# Patient Record
Sex: FEMALE | Race: BLACK OR AFRICAN AMERICAN | NOT HISPANIC OR LATINO | URBAN - METROPOLITAN AREA
[De-identification: names, ages, dates, MRNs, and addresses within clinical notes are randomized per-mention and may not be internally consistent; named-entity substitution may affect disease eponyms.]

---

## 2024-01-23 ENCOUNTER — HOSPITAL ENCOUNTER (EMERGENCY)
Facility: HOSPITAL | Age: 29
Discharge: HOME | End: 2024-01-23
Payer: COMMERCIAL

## 2024-01-23 VITALS
SYSTOLIC BLOOD PRESSURE: 109 MMHG | DIASTOLIC BLOOD PRESSURE: 73 MMHG | HEIGHT: 64 IN | TEMPERATURE: 97.3 F | RESPIRATION RATE: 18 BRPM | OXYGEN SATURATION: 97 % | HEART RATE: 91 BPM | BODY MASS INDEX: 33.8 KG/M2 | WEIGHT: 198 LBS

## 2024-01-23 DIAGNOSIS — K04.7 DENTAL INFECTION: Primary | ICD-10-CM

## 2024-01-23 LAB — PREGNANCY TEST URINE, POC: NEGATIVE

## 2024-01-23 PROCEDURE — 99283 EMERGENCY DEPT VISIT LOW MDM: CPT

## 2024-01-23 PROCEDURE — 81025 URINE PREGNANCY TEST: CPT | Performed by: PHYSICIAN ASSISTANT

## 2024-01-23 PROCEDURE — 2500000004 HC RX 250 GENERAL PHARMACY W/ HCPCS (ALT 636 FOR OP/ED): Performed by: PHYSICIAN ASSISTANT

## 2024-01-23 PROCEDURE — 99283 EMERGENCY DEPT VISIT LOW MDM: CPT | Performed by: PHYSICIAN ASSISTANT

## 2024-01-23 PROCEDURE — 2500000001 HC RX 250 WO HCPCS SELF ADMINISTERED DRUGS (ALT 637 FOR MEDICARE OP): Performed by: PHYSICIAN ASSISTANT

## 2024-01-23 RX ORDER — AMOXICILLIN AND CLAVULANATE POTASSIUM 875; 125 MG/1; MG/1
1 TABLET, FILM COATED ORAL ONCE
Status: COMPLETED | OUTPATIENT
Start: 2024-01-23 | End: 2024-01-23

## 2024-01-23 RX ORDER — NAPROXEN 500 MG/1
500 TABLET ORAL ONCE
Status: COMPLETED | OUTPATIENT
Start: 2024-01-23 | End: 2024-01-23

## 2024-01-23 RX ORDER — DEXAMETHASONE 4 MG/1
8 TABLET ORAL EVERY 8 HOURS
Qty: 4 TABLET | Refills: 0 | Status: SHIPPED | OUTPATIENT
Start: 2024-01-23 | End: 2024-01-24

## 2024-01-23 RX ORDER — NAPROXEN 500 MG/1
500 TABLET ORAL 2 TIMES DAILY
Qty: 20 TABLET | Refills: 0 | Status: SHIPPED | OUTPATIENT
Start: 2024-01-23

## 2024-01-23 RX ORDER — AMOXICILLIN AND CLAVULANATE POTASSIUM 875; 125 MG/1; MG/1
1 TABLET, FILM COATED ORAL EVERY 12 HOURS
Qty: 20 TABLET | Refills: 0 | Status: SHIPPED | OUTPATIENT
Start: 2024-01-23 | End: 2024-02-02

## 2024-01-23 RX ADMIN — DEXAMETHASONE 8 MG: 6 TABLET ORAL at 07:55

## 2024-01-23 RX ADMIN — AMOXICILLIN AND CLAVULANATE POTASSIUM 875 MG: 875; 125 TABLET, FILM COATED ORAL at 07:55

## 2024-01-23 RX ADMIN — NAPROXEN 500 MG: 500 TABLET ORAL at 07:55

## 2024-01-23 ASSESSMENT — LIFESTYLE VARIABLES
EVER HAD A DRINK FIRST THING IN THE MORNING TO STEADY YOUR NERVES TO GET RID OF A HANGOVER: NO
HAVE YOU EVER FELT YOU SHOULD CUT DOWN ON YOUR DRINKING: NO
EVER FELT BAD OR GUILTY ABOUT YOUR DRINKING: NO
REASON UNABLE TO ASSESS: NO
HAVE PEOPLE ANNOYED YOU BY CRITICIZING YOUR DRINKING: NO

## 2024-01-23 ASSESSMENT — PAIN SCALES - GENERAL: PAINLEVEL_OUTOF10: 3

## 2024-01-23 ASSESSMENT — COLUMBIA-SUICIDE SEVERITY RATING SCALE - C-SSRS
1. IN THE PAST MONTH, HAVE YOU WISHED YOU WERE DEAD OR WISHED YOU COULD GO TO SLEEP AND NOT WAKE UP?: NO
2. HAVE YOU ACTUALLY HAD ANY THOUGHTS OF KILLING YOURSELF?: NO
6. HAVE YOU EVER DONE ANYTHING, STARTED TO DO ANYTHING, OR PREPARED TO DO ANYTHING TO END YOUR LIFE?: NO

## 2024-01-23 ASSESSMENT — PAIN - FUNCTIONAL ASSESSMENT: PAIN_FUNCTIONAL_ASSESSMENT: 0-10

## 2024-01-23 ASSESSMENT — PAIN DESCRIPTION - LOCATION: LOCATION: JAW

## 2024-01-23 NOTE — ED PROVIDER NOTES
HPI:  28-year-old female otherwise healthy presents complaining of right lower dental pain.  States it has been hurting for about a week now.  Noticed swelling started couple days ago.  Denies any trouble eating or trouble swallowing however does have pain with chewing.  Went to dentist a few weeks ago however felt that there was no active dental problem, however this was prior to her current dental issues.  Denies any fevers chills night sweats or rigors.    Physical Exam:   GEN: Vitals noted. NAD  EYES:  EOMs grossly intact, anicteric sclera  SAMI: Mucosa moist.  Tenderness to the right lower posterior molar with mild gumline swelling appreciated externally, no obvious abscess, no trismus, no muffled voice, tolerating secretions well without difficulty  NECK: Supple.  CARD: RRR  PULMONARY: Moving air well. Clear all lung fields.  ABDOMEN: Soft, no guarding, no rigidity. Nontender. NABS  EXTREMITIES: Full ROM, no pitting edema,   SKIN: Intact, warm and dry  NEURO: Alert and oriented x 3, speech is clear, no obvious deficits noted.       ----------------------------------------------------------------------------------------------------------------------------    MDM:  28-year-old female presenting for dental pain with swelling.  On exam she is well-appearing and uncomfortable.  Vital signs stable.  She has mild right lower posterior tenderness in her molar with very mild swelling.  I believe she has an early dental infection causing her symptoms.  No trismus or signs of abscess clinically to indicate advanced imaging laboratory work or consulting surgery at this time.  Will start her on Augmentin give her a few doses of Decadron, naproxen for pain.  She is recommended to follow-up with dentist as soon as possible for her new pain, to return for new concerning or worsening symptoms that require emergent evaluation.  Return precautions reviewed.  Diagnoses as of 01/23/24 0750   Dental infection   []    No orders to  display     Labs Reviewed   POCT PREGNANCY, URINE - Normal       Result Value    Preg Test, Ur Negative         ----------------------------------------------------------------------------------------------------------------------------    This note was dictated using a speech recognition program.  While an attempt was made at proof reading to minimize errors, minor errors in transcription may be present call for questions.     Raghavendra Vazquez PA-C  01/23/24 5181

## 2024-01-23 NOTE — ED TRIAGE NOTES
Patient presents to the Emergency department with a chief complaint of lower right jaw dental pain x3-4 days. Patient states she is concerned she has an abscess in her gumline

## 2024-01-23 NOTE — Clinical Note
Lennox Castano was seen and treated in our emergency department on 1/23/2024.  She may return to work on 01/25/2024.       If you have any questions or concerns, please don't hesitate to call.      Raghavendra Vazquez PA-C

## 2024-01-23 NOTE — DISCHARGE INSTRUCTIONS
Take the full course of the Augmentin.  Take the naproxen for pain as well as your over-the-counter Tylenol.  Repeat the doses of the Decadron every 8 hours, starting 8 hours after first dose this morning.  Follow-up with the dentist, if you need a new 1 call [facility] at [phone]

## 2024-06-22 NOTE — PROGRESS NOTES
"Subjective   Lennox Castano is a 28 y.o. female who is here for an annual gyn exam.   Concerns: Patient would like IUD removed. She is no longer sexually active.     No LMP recorded (approximate). Patient has had an IUD.     Last pap: 2020 neg   History of abnormal Pap smear: no  STI testing: yes - all  HPV vaccination: Yes x1, amenable to #2 today     SoHx:  Vaginal hygiene: soap and water   Loss of sexual desire: Yes  Pain with sex: No  Able to orgasm: unsure   Living Situation: kids  Safe at home: Yes   Hx of DV: No   School/Employment: patient care at   Exercise: Yes     Substance:   Tobacco Use: Low Risk  (2024)    Patient History     Smoking Tobacco Use: Never     Smokeless Tobacco Use: Never     Passive Exposure: Not on file      Social History     Substance and Sexual Activity   Drug Use Not Currently    Types: Marijuana      Social History     Substance and Sexual Activity   Alcohol Use Not Currently       Social History     Substance and Sexual Activity   Sexual Activity Not Currently    Birth control/protection: I.U.D.     OB History          2    Para   2    Term           2    AB        Living   2         SAB        IAB        Ectopic        Multiple        Live Births   2               Past Medical History:   Diagnosis Date    Asthma ([facility])     Depression     Eczema     History of genital warts     s/p cryotherapy in     Hx of trichomoniasis      Past Surgical History:   Procedure Laterality Date    OTHER SURGICAL HISTORY  2015    Bronchoscopy (Diagnostic)     Family History   Problem Relation Name Age of Onset    Diabetes Mother      Diabetes Brother      Diabetes Mother's Sister      Diabetes Maternal Grandfather      Breast cancer Neg Hx      Ovarian cancer Neg Hx      Colon cancer Neg Hx      Pancreatic cancer Neg Hx         Review of Systems   All other systems reviewed and are negative.      Objective   /76   Ht 1.626 m (5' 4\")   Wt 90.3 kg (199 lb)  "  LMP  (Approximate) Comment: IUD mirena  BMI 34.16 kg/m²     Physical Exam  Constitutional:       Appearance: Normal appearance.   Pulmonary:      Effort: Pulmonary effort is normal.   Chest:      Comments: Declined exam    Genitourinary:     General: Normal vulva.      Vagina: Normal.      Cervix: Normal.   Skin:     General: Skin is warm and dry.   Neurological:      Mental Status: She is alert.   Psychiatric:         Mood and Affect: Mood normal.         Behavior: Behavior normal.         Thought Content: Thought content normal.         Judgment: Judgment normal.       IUD Removal    Date/Time: 6/28/2024 10:14 AM    Performed by: ELLYN Yeager  Authorized by: ELLYN Yeager    Consent:     Consent obtained:  Verbal and written    Consent given by:  Patient    Procedure risks and benefits discussed: yes      Patient questions answered: yes      Patient agrees, verbalizes understanding, and wants to proceed: yes    Procedure:     Removed with no complications: yes        Assessment/Plan   Problem List Items Addressed This Visit       Gynecologic exam normal    Overview     Pap collected  STI testing  HPV #2 today, discussed returning for #3 in approximately 4 months  IUD removed  Reviewed normal female anatomy; that hers looked very normal           Other Visit Diagnoses       Encounter for gynecological examination without abnormal finding    -  Primary    Relevant Orders    THINPREP PAP    Encounter for smoking cessation counseling        Relevant Medications    nicotine (Nicoderm CQ) 7 mg/24 hr patch    Screening examination for STI        Relevant Orders    HIV 1/2 Antigen/Antibody Screen with Reflex to Confirmation    Hepatitis C Antibody    Hepatitis B Surface Antigen    Syphilis Screen with Reflex    C. Trachomatis / N. Gonorrhoeae, Amplified Detection    Trichomonas vaginalis, Nucleic Acid Detection    Screening for malignant neoplasm of cervix        Relevant Orders     THINPREP PAP    Encounter for IUD removal        HPV vaccine counseling        Need for HPV vaccine                Plan:   -nicotine patch for smoking cessation sent   -discussed about breast self awareness  -reviewed mammogram starting at 40 years old unless otherwise clinically indicated   -encouraged healthy eating, exercise recommendations based of off  min of moderate intensity exercise a week  -RTC in 1  year for next annual or prn    Priyanka Coelho, HAL-ANIYAHM

## 2024-06-28 ENCOUNTER — OFFICE VISIT (OUTPATIENT)
Dept: OBSTETRICS AND GYNECOLOGY | Facility: HOSPITAL | Age: 29
End: 2024-06-28
Payer: COMMERCIAL

## 2024-06-28 VITALS
BODY MASS INDEX: 33.97 KG/M2 | SYSTOLIC BLOOD PRESSURE: 112 MMHG | WEIGHT: 199 LBS | HEIGHT: 64 IN | DIASTOLIC BLOOD PRESSURE: 76 MMHG

## 2024-06-28 DIAGNOSIS — Z11.3 SCREENING EXAMINATION FOR STI: ICD-10-CM

## 2024-06-28 DIAGNOSIS — Z71.6 ENCOUNTER FOR SMOKING CESSATION COUNSELING: ICD-10-CM

## 2024-06-28 DIAGNOSIS — Z12.4 SCREENING FOR MALIGNANT NEOPLASM OF CERVIX: ICD-10-CM

## 2024-06-28 DIAGNOSIS — Z23 NEED FOR HPV VACCINE: ICD-10-CM

## 2024-06-28 DIAGNOSIS — Z30.432 ENCOUNTER FOR IUD REMOVAL: ICD-10-CM

## 2024-06-28 DIAGNOSIS — Z01.419 GYNECOLOGIC EXAM NORMAL: ICD-10-CM

## 2024-06-28 DIAGNOSIS — Z71.85 HPV VACCINE COUNSELING: ICD-10-CM

## 2024-06-28 DIAGNOSIS — Z01.419 ENCOUNTER FOR GYNECOLOGICAL EXAMINATION WITHOUT ABNORMAL FINDING: Primary | ICD-10-CM

## 2024-06-28 PROCEDURE — 90651 9VHPV VACCINE 2/3 DOSE IM: CPT | Performed by: ADVANCED PRACTICE MIDWIFE

## 2024-06-28 PROCEDURE — 58301 REMOVE INTRAUTERINE DEVICE: CPT | Performed by: ADVANCED PRACTICE MIDWIFE

## 2024-06-28 PROCEDURE — 87661 TRICHOMONAS VAGINALIS AMPLIF: CPT | Performed by: ADVANCED PRACTICE MIDWIFE

## 2024-06-28 PROCEDURE — 87491 CHLMYD TRACH DNA AMP PROBE: CPT | Performed by: ADVANCED PRACTICE MIDWIFE

## 2024-06-28 PROCEDURE — 99395 PREV VISIT EST AGE 18-39: CPT | Performed by: ADVANCED PRACTICE MIDWIFE

## 2024-06-28 RX ORDER — LEVONORGESTREL 52 MG/1
1 INTRAUTERINE DEVICE INTRAUTERINE ONCE
COMMUNITY
End: 2024-06-28 | Stop reason: ALTCHOICE

## 2024-06-28 RX ORDER — NICOTINE 7MG/24HR
1 PATCH, TRANSDERMAL 24 HOURS TRANSDERMAL EVERY 24 HOURS
Qty: 30 PATCH | Refills: 3 | Status: SHIPPED | OUTPATIENT
Start: 2024-06-28 | End: 2024-07-28

## 2024-06-28 RX ORDER — ALBUTEROL SULFATE 90 UG/1
4 AEROSOL, METERED RESPIRATORY (INHALATION) EVERY 6 HOURS PRN
COMMUNITY
Start: 2019-02-13

## 2024-06-28 SDOH — ECONOMIC STABILITY: FOOD INSECURITY: WITHIN THE PAST 12 MONTHS, YOU WORRIED THAT YOUR FOOD WOULD RUN OUT BEFORE YOU GOT MONEY TO BUY MORE.: NEVER TRUE

## 2024-06-28 SDOH — ECONOMIC STABILITY: FOOD INSECURITY: WITHIN THE PAST 12 MONTHS, THE FOOD YOU BOUGHT JUST DIDN'T LAST AND YOU DIDN'T HAVE MONEY TO GET MORE.: NEVER TRUE

## 2024-06-28 ASSESSMENT — ENCOUNTER SYMPTOMS
DEPRESSION: 0
LOSS OF SENSATION IN FEET: 0
OCCASIONAL FEELINGS OF UNSTEADINESS: 0

## 2024-06-28 ASSESSMENT — PATIENT HEALTH QUESTIONNAIRE - PHQ9
2. FEELING DOWN, DEPRESSED OR HOPELESS: NOT AT ALL
SUM OF ALL RESPONSES TO PHQ9 QUESTIONS 1 & 2: 0
1. LITTLE INTEREST OR PLEASURE IN DOING THINGS: NOT AT ALL

## 2024-06-28 ASSESSMENT — PAIN SCALES - GENERAL: PAINLEVEL: 0-NO PAIN

## 2024-06-29 LAB
C TRACH RRNA SPEC QL NAA+PROBE: NEGATIVE
N GONORRHOEA DNA SPEC QL PROBE+SIG AMP: NEGATIVE
T VAGINALIS RRNA SPEC QL NAA+PROBE: NEGATIVE

## 2024-07-09 LAB
CYTOLOGY CMNT CVX/VAG CYTO-IMP: NORMAL
LAB AP HPV GENOTYPE QUESTION: YES
LAB AP HPV HR: NORMAL
LABORATORY COMMENT REPORT: NORMAL
LABORATORY COMMENT REPORT: NORMAL
PATH REPORT.TOTAL CANCER: NORMAL

## 2024-11-10 ENCOUNTER — HOSPITAL ENCOUNTER (EMERGENCY)
Facility: HOSPITAL | Age: 29
Discharge: HOME | End: 2024-11-10

## 2024-11-10 VITALS
DIASTOLIC BLOOD PRESSURE: 83 MMHG | RESPIRATION RATE: 16 BRPM | BODY MASS INDEX: 33.32 KG/M2 | TEMPERATURE: 98.2 F | SYSTOLIC BLOOD PRESSURE: 125 MMHG | WEIGHT: 200 LBS | HEIGHT: 65 IN | OXYGEN SATURATION: 100 % | HEART RATE: 87 BPM

## 2024-11-10 DIAGNOSIS — H10.31 ACUTE CONJUNCTIVITIS OF RIGHT EYE, UNSPECIFIED ACUTE CONJUNCTIVITIS TYPE: Primary | ICD-10-CM

## 2024-11-10 PROCEDURE — 99284 EMERGENCY DEPT VISIT MOD MDM: CPT | Performed by: PHYSICIAN ASSISTANT

## 2024-11-10 PROCEDURE — 99283 EMERGENCY DEPT VISIT LOW MDM: CPT

## 2024-11-10 RX ORDER — TETRACAINE HYDROCHLORIDE 5 MG/ML
1 SOLUTION OPHTHALMIC ONCE
Status: DISCONTINUED | OUTPATIENT
Start: 2024-11-10 | End: 2024-11-10 | Stop reason: HOSPADM

## 2024-11-10 RX ORDER — POLYMYXIN B SULFATE AND TRIMETHOPRIM 1; 10000 MG/ML; [USP'U]/ML
1 SOLUTION OPHTHALMIC EVERY 4 HOURS
Qty: 10 ML | Refills: 0 | Status: SHIPPED | OUTPATIENT
Start: 2024-11-10 | End: 2024-11-17

## 2024-11-10 ASSESSMENT — COLUMBIA-SUICIDE SEVERITY RATING SCALE - C-SSRS
6. HAVE YOU EVER DONE ANYTHING, STARTED TO DO ANYTHING, OR PREPARED TO DO ANYTHING TO END YOUR LIFE?: NO
1. IN THE PAST MONTH, HAVE YOU WISHED YOU WERE DEAD OR WISHED YOU COULD GO TO SLEEP AND NOT WAKE UP?: NO
2. HAVE YOU ACTUALLY HAD ANY THOUGHTS OF KILLING YOURSELF?: NO

## 2024-11-10 ASSESSMENT — LIFESTYLE VARIABLES
EVER FELT BAD OR GUILTY ABOUT YOUR DRINKING: NO
HAVE PEOPLE ANNOYED YOU BY CRITICIZING YOUR DRINKING: NO
HAVE YOU EVER FELT YOU SHOULD CUT DOWN ON YOUR DRINKING: NO
TOTAL SCORE: 0
EVER HAD A DRINK FIRST THING IN THE MORNING TO STEADY YOUR NERVES TO GET RID OF A HANGOVER: NO

## 2024-11-10 NOTE — Clinical Note
Lennox Castano was seen and treated in our emergency department on 11/10/2024.  She may return to work on 11/12/2024.       If you have any questions or concerns, please don't hesitate to call.      Lyudmila Burton PA-C

## 2024-11-10 NOTE — ED PROVIDER NOTES
"27 yo female with PMH of asthma presenting with right eye redness and irritation. Pt states she woke up yesterday afternoon with her eye crusted shut. Initially had FB sensation which has since subsided. Complains of itching, mild pain, and discharge. Denies fevers, chills, and visual changes. Has a young daughter who recently had bacterial conjunctivitis.  She denies any glasses or contact use.  She states otherwise she has been in her normal state of health.  She denies any URI symptoms.               Visit Vitals  /83   Pulse 87   Temp 36.8 °C (98.2 °F) (Temporal)   Resp 16   Ht 1.651 m (5' 5\")   Wt 90.7 kg (200 lb)   SpO2 100%   BMI 33.28 kg/m²   OB Status Implant   Smoking Status Never   BSA 2.04 m²          Physical Exam     Physical exam:  Gen.: Vitals noted no distress afebrile. Normal phonation, no stridor, no trismus.   Optho: EOMI. PERRL. Exam of the left eye is unremarkable. Exam of the right eye shows conjunctival injection. There are no obvious foreign bodies. The lids were everted without foreign bodies as well.  fluorescein was instilled with resolution of the patient's pain. Was viewed under the wood's lamp. There did not appear to be any dye uptake, ulcerations, abrasions, or dendrites. Negative Bhavesh sign. There is no direct or consensual photophobia. No pre-or post-septal cellulitis.   ENT: Hearing grossly intact. Mastoids nontender. Mucous membranes moist. Posterior oropharynx without erythema, exudate, or swelling. Uvula is in the midline and nonedematous.   Neck: Supple. No meningismus through full range of motion. No lymphadenopathy.   Cardiac: Regular rate rhythm no murmur.   Pulmonary: Good air exchange. Lungs clear bilaterally with good chest wall excursion. No rales, rhonchi or wheezing. No accessory muscle use or stridor.  Abdomen: Soft nontender nonsurgical throughout. Normoactive bowel sounds.   Extremities: No peripheral edema, full range of motion. Moves all extremities " freely.  Skin: No rash.   Neuro: No focal neurologic deficits.       Labs Reviewed - No data to display    No orders to display           ED Course & MDM     Medical Decision Making  This is a 28-year-old female with no significant past medical history presents to the ED with right eye pain, redness, and crusting for the past 2 days.  Vital stable upon arrival to the ED.  On examination patient did have conjunctival injection to the right eye.  No crusting noted to her eyelashes.  No purulent drainage.  PERRL.  EOMI.  No erythema or excess warmth noted to her upper or lower eyelids.  No pain with extraocular eye movements.  Visual acuity obtained and was 20/20 left eye and 20/30 right eye.  Patient's eye was viewed under Woods lamp with fluorescein instilled.  Patient declined tetracaine.  There was no areas of dye uptake.  I discussed this with the patient.  She will be treated for conjunctivitis today with Polytrim drops.  She was advised to follow with her primary care provider as needed.  She was agreeable to this plan.  She was given signs and symptoms to return to the ED with and was discharged from the ED in stable condition.    Risk  Prescription drug management.         Diagnoses as of 11/10/24 0755   Acute conjunctivitis of right eye, unspecified acute conjunctivitis type       Patient was seen independently    Procedures    JONAH Majano, PA-C     Lyudmila Burton PA-C  11/10/24 0752

## 2025-07-09 ENCOUNTER — OFFICE VISIT (OUTPATIENT)
Dept: OBSTETRICS AND GYNECOLOGY | Facility: CLINIC | Age: 30
End: 2025-07-09
Payer: COMMERCIAL

## 2025-07-09 VITALS
SYSTOLIC BLOOD PRESSURE: 104 MMHG | DIASTOLIC BLOOD PRESSURE: 73 MMHG | HEART RATE: 92 BPM | BODY MASS INDEX: 35.13 KG/M2 | WEIGHT: 211.1 LBS

## 2025-07-09 DIAGNOSIS — Z11.3 SCREENING EXAMINATION FOR STI: ICD-10-CM

## 2025-07-09 DIAGNOSIS — Z01.419 WELL WOMAN EXAM: Primary | ICD-10-CM

## 2025-07-09 DIAGNOSIS — N92.0 MENORRHAGIA WITH REGULAR CYCLE: ICD-10-CM

## 2025-07-09 DIAGNOSIS — N89.8 VAGINAL DISCHARGE: ICD-10-CM

## 2025-07-09 PROCEDURE — 1036F TOBACCO NON-USER: CPT

## 2025-07-09 PROCEDURE — 99395 PREV VISIT EST AGE 18-39: CPT

## 2025-07-09 ASSESSMENT — PAIN SCALES - GENERAL: PAINLEVEL_OUTOF10: 0-NO PAIN

## 2025-07-09 ASSESSMENT — ENCOUNTER SYMPTOMS
GASTROINTESTINAL NEGATIVE: 0
CONSTITUTIONAL NEGATIVE: 0
MUSCULOSKELETAL NEGATIVE: 0
CARDIOVASCULAR NEGATIVE: 0
ENDOCRINE NEGATIVE: 0
PSYCHIATRIC NEGATIVE: 0
RESPIRATORY NEGATIVE: 0
ALLERGIC/IMMUNOLOGIC NEGATIVE: 0
HEMATOLOGIC/LYMPHATIC NEGATIVE: 0
NEUROLOGICAL NEGATIVE: 0
EYES NEGATIVE: 0

## 2025-07-09 NOTE — PROGRESS NOTES
Subjective   Lennox Castano is a 29 y.o. female who is here for a routine exam.  She is sexually active and desires sti screening. She has no pain or bleeding with sex. She denies bladder or bowel concerns.    Patient has concerns from increase in watery discharge and odor over the last month. Patient reports mild itching. Patient reports using monistat without resolution in symptoms.     Current contraception: none  LMP: 6/15/25  Menses: regular and monthly. Heavy first two days with every 1-2 hour pad changes on day two.   Last pap:  nml  History of abnormal Pap smear: no  Due for pap: no  Family history of uterine or ovarian cancer: yes - Maternal aunt with uterine cancer  Family history of prostate cancer: no  Family history of pancreatic cancer: no  Family hx of BRCA1/BRCA2: no  Family history of breast cancer: no  Last mammogram: none  Gardasil: had      OB History          2    Para   2    Term           2    AB        Living   2         SAB        IAB        Ectopic        Multiple        Live Births   2                 Review of Systems    Objective   /73   Pulse 92   Wt 95.8 kg (211 lb 1.6 oz)   LMP 06/15/2025   BMI 35.13 kg/m²     Physical Exam  HENT:      Mouth/Throat:      Mouth: Mucous membranes are moist.   Eyes:      Conjunctiva/sclera: Conjunctivae normal.   Neck:      Thyroid: No thyroid mass, thyromegaly or thyroid tenderness.   Cardiovascular:      Rate and Rhythm: Normal rate and regular rhythm.      Pulses: Normal pulses.   Pulmonary:      Effort: Pulmonary effort is normal.      Breath sounds: Normal breath sounds.   Chest:   Breasts:     Breasts are symmetrical.      Right: Normal.      Left: Normal.   Abdominal:      General: Abdomen is flat.      Palpations: Abdomen is soft.      Hernia: There is no hernia in the left inguinal area or right inguinal area.   Genitourinary:     General: Normal vulva.      Vagina: Normal.      Cervix: Normal.      Uterus: Normal.        Adnexa: Right adnexa normal and left adnexa normal.      Comments: Scant amount of thin white discharge noted in vaginal vault.   Musculoskeletal:         General: Normal range of motion.      Cervical back: Normal range of motion.   Lymphadenopathy:      Cervical: No cervical adenopathy.      Right cervical: No superficial, deep or posterior cervical adenopathy.     Left cervical: No superficial, deep or posterior cervical adenopathy.      Lower Body: No right inguinal adenopathy. No left inguinal adenopathy.   Skin:     General: Skin is warm.      Capillary Refill: Capillary refill takes less than 2 seconds.   Neurological:      Mental Status: She is alert and oriented to person, place, and time.   Psychiatric:         Mood and Affect: Mood normal.         Behavior: Behavior normal.       Assessment/Plan   A&P --> 29 y.o. y.o woman for annual GYN exam.     - Health Maintenance -->  - Routine follow up with PCP for health maintenance examination encouraged, including TSH, cholesterol, and Vit. D evaluation.  Self breast awareness encouraged; concerning characteristics of breasts reviewed - Pt. will report general concerns, any adherent lumps, skin dimpling/puckering or color changes, and any nipple discharge.    Diet and exercise reviewed.     Pap-utd:- Reviewed ACOG and ASCCP guidelines with pt.      - STD Screening: genital cx for gc/ct and trich. Orders placed for hep B and C, syphilis, and HIV     - Contraception Plan: none, declines. Encouraged safe sex practices    - menorrhagia: patient declines contraception for management.  Discussed NSAID use as an alternative. Advised on Ibuprofen 600 mg two to four times daily or   Naproxen 500 mg at onset followed by 250  mg to 500 mg twice a day for the first few days of menses.      - F/U 1 year or as needed.    HAL Putnam-LEANN

## 2025-07-10 LAB
BV SCORE VAG QL: NORMAL
C TRACH RRNA SPEC QL NAA+PROBE: NOT DETECTED
N GONORRHOEA RRNA SPEC QL NAA+PROBE: NOT DETECTED
QUEST GC CT AMPLIFIED (ALWAYS MESSAGE): NORMAL
T VAGINALIS RRNA SPEC QL NAA+PROBE: NOT DETECTED

## 2025-07-11 ENCOUNTER — TELEPHONE (OUTPATIENT)
Dept: OBSTETRICS AND GYNECOLOGY | Facility: CLINIC | Age: 30
End: 2025-07-11
Payer: COMMERCIAL

## 2025-08-20 ENCOUNTER — HOSPITAL ENCOUNTER (EMERGENCY)
Facility: HOSPITAL | Age: 30
Discharge: HOME | End: 2025-08-20
Attending: EMERGENCY MEDICINE
Payer: COMMERCIAL

## 2025-08-20 VITALS
BODY MASS INDEX: 35.34 KG/M2 | WEIGHT: 207 LBS | HEIGHT: 64 IN | RESPIRATION RATE: 16 BRPM | HEART RATE: 76 BPM | DIASTOLIC BLOOD PRESSURE: 70 MMHG | TEMPERATURE: 97.3 F | SYSTOLIC BLOOD PRESSURE: 105 MMHG | OXYGEN SATURATION: 97 %

## 2025-08-20 DIAGNOSIS — H10.9 BACTERIAL CONJUNCTIVITIS: Primary | ICD-10-CM

## 2025-08-20 PROCEDURE — 99283 EMERGENCY DEPT VISIT LOW MDM: CPT | Performed by: EMERGENCY MEDICINE

## 2025-08-20 PROCEDURE — 2500000001 HC RX 250 WO HCPCS SELF ADMINISTERED DRUGS (ALT 637 FOR MEDICARE OP): Mod: SE

## 2025-08-20 PROCEDURE — 99282 EMERGENCY DEPT VISIT SF MDM: CPT | Performed by: EMERGENCY MEDICINE

## 2025-08-20 PROCEDURE — 99283 EMERGENCY DEPT VISIT LOW MDM: CPT

## 2025-08-20 RX ORDER — POLYMYXIN B SULFATE AND TRIMETHOPRIM 1; 10000 MG/ML; [USP'U]/ML
1 SOLUTION OPHTHALMIC EVERY 4 HOURS
Qty: 3 ML | Refills: 0 | Status: SHIPPED | OUTPATIENT
Start: 2025-08-20 | End: 2025-08-30

## 2025-08-20 RX ORDER — POLYMYXIN B SULFATE AND TRIMETHOPRIM 1; 10000 MG/ML; [USP'U]/ML
1 SOLUTION OPHTHALMIC
Status: COMPLETED | OUTPATIENT
Start: 2025-08-20 | End: 2025-08-20

## 2025-08-20 RX ADMIN — POLYMYXIN B SULFATE AND TRIMETHOPRIM 1 DROP: 10000; 1 SOLUTION OPHTHALMIC at 05:36
